# Patient Record
Sex: FEMALE | Race: WHITE | NOT HISPANIC OR LATINO | URBAN - METROPOLITAN AREA
[De-identification: names, ages, dates, MRNs, and addresses within clinical notes are randomized per-mention and may not be internally consistent; named-entity substitution may affect disease eponyms.]

---

## 2018-07-06 ENCOUNTER — EMERGENCY (EMERGENCY)
Facility: HOSPITAL | Age: 33
LOS: 1 days | Discharge: HOME | End: 2018-07-06
Admitting: EMERGENCY MEDICINE
Payer: COMMERCIAL

## 2018-07-06 VITALS
TEMPERATURE: 99 F | WEIGHT: 214.07 LBS | OXYGEN SATURATION: 96 % | RESPIRATION RATE: 20 BRPM | SYSTOLIC BLOOD PRESSURE: 120 MMHG | DIASTOLIC BLOOD PRESSURE: 80 MMHG | HEART RATE: 99 BPM | HEIGHT: 66 IN

## 2018-07-06 VITALS
SYSTOLIC BLOOD PRESSURE: 115 MMHG | OXYGEN SATURATION: 97 % | RESPIRATION RATE: 20 BRPM | TEMPERATURE: 99 F | HEART RATE: 75 BPM | DIASTOLIC BLOOD PRESSURE: 78 MMHG

## 2018-07-06 DIAGNOSIS — R40.0 SOMNOLENCE: ICD-10-CM

## 2018-07-06 LAB — PCP SPEC-MCNC: SIGNIFICANT CHANGE UP

## 2018-07-06 PROCEDURE — 80307 DRUG TEST PRSMV CHEM ANLYZR: CPT

## 2018-07-06 PROCEDURE — 99283 EMERGENCY DEPT VISIT LOW MDM: CPT
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
## 2018-07-06 NOTE — ED PROVIDER NOTE - MEDICAL DECISION MAKING DETAILS
tox screen neg will advise FU with PMD and has safe DC plan I have discussed the discharge plan with the patient. The patient agrees with the plan, as discussed.  The patient understands Emergency Department diagnosis is a preliminary diagnosis often based on limited information and that the patient must adhere to the follow-up plan as discussed.  The patient understands that if the symptoms worsen or if prescribed medications do not have the desired/planned effect that the patient may return to the Emergency Department at any time for further evaluation and treatment.

## 2018-10-16 NOTE — ED PROVIDER NOTE - TEMPLATE, MLM
"FAMILY MEDICINE    Patient Active Problem List   Diagnosis    ASCUS with positive high risk HPV    Postnasal drip       CC:   Chief Complaint   Patient presents with    Cough    Nasal Congestion       SUBJECTIVE:  Naila Frey   is a 21 y.o. female  - reports that had a "cold" about 1-2 weeks ago with cough, congestion and myalgias and reports that she is feeling much better. No fevers. She is just concerned because she has a hacking cough that worst in AM and prior to bedtime. Not affecting sleep. No shortness of breath, chest pain, weakness or headaches. Not currently taking any OTC medications. No wheezing        ROS: Review of Systems   Constitutional: Negative for activity change, appetite change, chills, fatigue and fever.   HENT: Positive for congestion, postnasal drip and rhinorrhea. Negative for ear discharge, ear pain, mouth sores, nosebleeds, sinus pressure, sinus pain and sore throat.    Respiratory: Negative for cough, chest tightness and shortness of breath.    Cardiovascular: Negative for chest pain, palpitations and leg swelling.   Gastrointestinal: Negative for abdominal pain, constipation, diarrhea, nausea and vomiting.   Musculoskeletal: Negative for myalgias, neck pain and neck stiffness.   Skin: Negative for rash.   Neurological: Negative for dizziness, light-headedness and headaches.   Psychiatric/Behavioral: Negative for sleep disturbance.       Past Medical History:   Diagnosis Date    Abnormal Pap smear of cervix        Past Surgical History:   Procedure Laterality Date    PRIMARY DELIVERY- SECTION N/A 10/31/2016    Performed by Gina Roman MD at [facility] OR       ALLERGIES: Review of patient's allergies indicates:  No Known Allergies    MEDS:   Current Outpatient Medications:     levonorgestrel (MIRENA) 20 mcg/24 hr (5 years) IUD, 1 each by Intrauterine route once., Disp: , Rfl:     OBJECTIVE:   Vitals:    10/16/18 1045   BP: 110/70   BP Location: Left arm " "  Patient Position: Sitting   BP Method: Medium (Manual)   Pulse: (!) 59   Temp: 99.1 °F (37.3 °C)   TempSrc: Oral   SpO2: 98%   Weight: 65.4 kg (144 lb 1.6 oz)   Height: 5' 1" (1.549 m)     Body mass index is 27.23 kg/m².    Physical Exam   Constitutional: No distress.   HENT:   Head: Normocephalic and atraumatic.   Right Ear: Ear canal normal. Tympanic membrane is retracted.   Left Ear: Tympanic membrane and ear canal normal.   Nose: Mucosal edema and rhinorrhea present. Right sinus exhibits no maxillary sinus tenderness and no frontal sinus tenderness. Left sinus exhibits no maxillary sinus tenderness and no frontal sinus tenderness.   Mouth/Throat: Uvula is midline, oropharynx is clear and moist and mucous membranes are normal.   Neck: Neck supple.   Cardiovascular: Normal rate, regular rhythm, normal heart sounds and intact distal pulses.   Pulmonary/Chest: Effort normal and breath sounds normal.   Neurological: She is alert.   Skin: Skin is warm.         ASSESSMENT/PLAN:  Problem List Items Addressed This Visit     Postnasal drip    Current Assessment & Plan     - s/p URI and no signs of PNA or bronchitis  - supportive care  - reassurance  - expect to resolved in 3-4 weeks and if persistent rec RTC             Follow-up as needed.     Dr. Martha King D.O.   Family Medicine    " General